# Patient Record
Sex: FEMALE | Race: WHITE | NOT HISPANIC OR LATINO | Employment: FULL TIME | RURAL
[De-identification: names, ages, dates, MRNs, and addresses within clinical notes are randomized per-mention and may not be internally consistent; named-entity substitution may affect disease eponyms.]

---

## 2017-08-09 ENCOUNTER — OFFICE VISIT (OUTPATIENT)
Dept: OBSTETRICS AND GYNECOLOGY | Facility: CLINIC | Age: 42
End: 2017-08-09

## 2017-08-09 VITALS
SYSTOLIC BLOOD PRESSURE: 145 MMHG | WEIGHT: 227 LBS | HEIGHT: 67 IN | BODY MASS INDEX: 35.63 KG/M2 | DIASTOLIC BLOOD PRESSURE: 95 MMHG

## 2017-08-09 DIAGNOSIS — E66.9 OBESITY, CLASS II, BMI 35-39.9: Chronic | ICD-10-CM

## 2017-08-09 DIAGNOSIS — F33.41 RECURRENT MAJOR DEPRESSIVE DISORDER, IN PARTIAL REMISSION (HCC): Chronic | ICD-10-CM

## 2017-08-09 DIAGNOSIS — K50.913 CROHN'S DISEASE WITH FISTULA, UNSPECIFIED GASTROINTESTINAL TRACT LOCATION (HCC): Chronic | ICD-10-CM

## 2017-08-09 DIAGNOSIS — I10 ESSENTIAL HYPERTENSION: Chronic | ICD-10-CM

## 2017-08-09 DIAGNOSIS — Z12.31 ENCOUNTER FOR SCREENING MAMMOGRAM FOR BREAST CANCER: ICD-10-CM

## 2017-08-09 DIAGNOSIS — Z01.419 ENCOUNTER FOR ANNUAL ROUTINE GYNECOLOGICAL EXAMINATION: Primary | ICD-10-CM

## 2017-08-09 PROBLEM — J45.909 ASTHMA: Chronic | Status: ACTIVE | Noted: 2017-08-09

## 2017-08-09 PROBLEM — K50.90 CROHN'S DISEASE (HCC): Chronic | Status: ACTIVE | Noted: 2017-08-09

## 2017-08-09 PROCEDURE — 99386 PREV VISIT NEW AGE 40-64: CPT | Performed by: OBSTETRICS & GYNECOLOGY

## 2017-08-09 PROCEDURE — 87624 HPV HI-RISK TYP POOLED RSLT: CPT | Performed by: OBSTETRICS & GYNECOLOGY

## 2017-08-09 PROCEDURE — 88142 CYTOPATH C/V THIN LAYER: CPT | Performed by: OBSTETRICS & GYNECOLOGY

## 2017-08-09 RX ORDER — AMLODIPINE BESYLATE 2.5 MG/1
TABLET ORAL
Refills: 5 | COMMUNITY
Start: 2017-07-24

## 2017-08-09 RX ORDER — CIPROFLOXACIN 500 MG/1
TABLET, FILM COATED ORAL
Refills: 0 | COMMUNITY
Start: 2017-07-20

## 2017-08-09 RX ORDER — NORETHINDRONE ACETATE AND ETHINYL ESTRADIOL AND FERROUS FUMARATE 1MG-20(21)
KIT ORAL
Refills: 6 | COMMUNITY
Start: 2017-07-28 | End: 2017-12-04 | Stop reason: SDUPTHER

## 2017-08-09 RX ORDER — BUPROPION HYDROCHLORIDE 150 MG/1
150 TABLET, EXTENDED RELEASE ORAL 2 TIMES DAILY
Qty: 60 TABLET | Refills: 11 | Status: SHIPPED | OUTPATIENT
Start: 2017-08-09

## 2017-08-09 NOTE — PROGRESS NOTES
Mike Restrepo is a 42 y.o. y/o female     Chief Complaint: Establish care, lethargy, weight gain, depression, needs birth control    HPI: 42-year-old  who had 1 prior vaginal delivery.  She has Crohn's disease with a fistula from her intestines to her bladder and extensive pelvic adhesive disease that made a laparoscopic sterilization not possible.  She also has hypertension.  She currently takes a generic birth control pill which she does not like the side effects.  She is has lethargy, weight gain, and depression.  I reviewed her extensive OB/GYN records.    Review of Systems   Constitutional: Positive for fatigue and unexpected weight change. Negative for activity change, appetite change, chills, diaphoresis and fever.   Gastrointestinal: Negative for abdominal pain, constipation, diarrhea and nausea.   Genitourinary: Positive for menstrual problem. Negative for difficulty urinating, dyspareunia, dysuria, pelvic pain, urgency, vaginal bleeding, vaginal discharge and vaginal pain.   Neurological: Negative for headaches.   Psychiatric/Behavioral: Positive for dysphoric mood. The patient is not nervous/anxious.    All other systems reviewed and are negative.     Breast ROS: negative    The following portions of the patient's history were reviewed and updated as appropriate: allergies, current medications, past family history, past medical history, past social history, past surgical history and problem list.    No Known Allergies       Current Outpatient Prescriptions:   •  amLODIPine (NORVASC) 2.5 MG tablet, TAKE 1 TABLET BY MOUTH EVERY DAY, Disp: , Rfl: 5  •  buPROPion SR (WELLBUTRIN SR) 150 MG 12 hr tablet, Take 1 tablet by mouth 2 (Two) Times a Day., Disp: 60 tablet, Rfl: 11  •  ciprofloxacin (CIPRO) 500 MG tablet, TAKE 1 TABLET BY MOUTH TWICE A DAY, Disp: , Rfl: 0  •  JUNEL FE 1/20 1-20 MG-MCG per tablet, TAKE 1 TABLET(S) EVERY DAY BY ORAL ROUTE., Disp: , Rfl: 6     The patient has a family history of  "  Family History   Problem Relation Age of Onset   • Coronary artery disease Father    • Melanoma Father    • Asthma Son         Past Medical History:   Diagnosis Date   • Asthma    • Crohn's disease    • Hypertension    • Obesity, Class II, BMI 35-39.9 8/9/2017   • Recurrent major depressive disorder, in partial remission 8/9/2017        OB History     No data available           Social History     Social History   • Marital status:      Spouse name: N/A   • Number of children: N/A   • Years of education: N/A     Occupational History   • Not on file.     Social History Main Topics   • Smoking status: Never Smoker   • Smokeless tobacco: Never Used   • Alcohol use No   • Drug use: No   • Sexual activity: Yes     Birth control/ protection: Pill     Other Topics Concern   • Not on file     Social History Narrative   • No narrative on file        Past Surgical History:   Procedure Laterality Date   • DIAGNOSTIC LAPAROSCOPY          Patient Active Problem List   Diagnosis   • Crohn's disease   • Asthma   • Hypertension   • Obesity, Class II, BMI 35-39.9   • Recurrent major depressive disorder, in partial remission        Documented Vitals    08/09/17 0908   BP: 145/95   Weight: 227 lb (103 kg)   Height: 67\" (170.2 cm)   PainSc: 0-No pain       Physical Exam   Constitutional: She is oriented to person, place, and time. No distress.   Obese white female weighing 227 pounds with BMI 35.6.   HENT:   Head: Normocephalic and atraumatic.   Eyes: Conjunctivae and EOM are normal. Pupils are equal, round, and reactive to light.   Neck: Normal range of motion. Neck supple. No JVD present. No tracheal deviation present. No thyromegaly present.   Cardiovascular: Normal rate, regular rhythm, normal heart sounds and intact distal pulses.  Exam reveals no gallop and no friction rub.    No murmur heard.  Pulmonary/Chest: Effort normal and breath sounds normal. No stridor. No respiratory distress. She has no wheezes. She has no " rales. She exhibits no tenderness. Right breast exhibits no inverted nipple, no mass, no nipple discharge, no skin change and no tenderness. Left breast exhibits no inverted nipple, no mass, no nipple discharge, no skin change and no tenderness. Breasts are symmetrical. There is no breast swelling.   Abdominal: Soft. Bowel sounds are normal. She exhibits no distension and no mass. There is no tenderness. There is no rebound and no guarding. No hernia. Hernia confirmed negative in the right inguinal area and confirmed negative in the left inguinal area.   Genitourinary: Vagina normal and uterus normal. Rectal exam shows no external hemorrhoid. No breast tenderness, discharge or bleeding. No labial fusion. There is no rash, tenderness, lesion or injury on the right labia. There is no rash, tenderness, lesion or injury on the left labia. Uterus is not deviated, not enlarged, not fixed and not tender. Cervix exhibits no motion tenderness, no discharge and no friability. Right adnexum displays no mass, no tenderness and no fullness. Left adnexum displays no mass, no tenderness and no fullness. No vaginal discharge found.   Genitourinary Comments: Pap smear of the cervix performed.  Urethral meatus without erythema or prolapse.   Musculoskeletal: Normal range of motion. She exhibits no edema, tenderness or deformity.   Lymphadenopathy:     She has no cervical adenopathy.        Right: No inguinal adenopathy present.        Left: No inguinal adenopathy present.   Neurological: She is alert and oriented to person, place, and time. She has normal reflexes. She displays normal reflexes. No cranial nerve deficit. She exhibits normal muscle tone. Coordination normal.   Skin: Skin is warm and dry. No rash noted. She is not diaphoretic. No erythema. No pallor.   Psychiatric: She has a normal mood and affect. Her behavior is normal. Judgment and thought content normal.   Nursing note and vitals reviewed.      Assessment         Diagnosis Plan   1. Encounter for annual routine gynecological examination  Liquid-based Pap Smear, Screening   2. Recurrent major depressive disorder, in partial remission     3. Essential hypertension     4. Crohn's disease with fistula, unspecified gastrointestinal tract location     5. Obesity, Class II, BMI 35-39.9     6. Encounter for screening mammogram for breast cancer  Mammo Screening Digital Tomosynthesis Bilateral With CAD         Plan      1. Wellbutrin  mg twice a day.  2. Lo Loestrin OCPs.  3. If problems with this birth control, consider Mirena IUD placement.  4. Encouraged in diet and exercise.  5. Handouts on depression, hot flashes, exercise, and vitamin use.   6. Follow-up in 6 weeks.  Follow-up sooner as needed.            This document has been electronically signed by Bonilla Link MD on August 9, 2017 10:25 PM

## 2017-08-16 LAB
LAB AP CASE REPORT: NORMAL
LAB AP GYN ADDITIONAL INFORMATION: NORMAL
Lab: NORMAL
PATH INTERP SPEC-IMP: NORMAL
STAT OF ADQ CVX/VAG CYTO-IMP: NORMAL

## 2017-08-21 LAB — HPV I/H RISK 4 DNA CVX QL PROBE+SIG AMP: NEGATIVE

## 2017-12-04 RX ORDER — NORETHINDRONE ACETATE AND ETHINYL ESTRADIOL AND FERROUS FUMARATE 1MG-20(21)
1 KIT ORAL DAILY
Qty: 28 TABLET | Refills: 12 | Status: SHIPPED | OUTPATIENT
Start: 2017-12-04 | End: 2018-02-06 | Stop reason: HOSPADM

## 2018-02-06 ENCOUNTER — TELEPHONE (OUTPATIENT)
Dept: OBSTETRICS AND GYNECOLOGY | Facility: CLINIC | Age: 43
End: 2018-02-06

## 2018-02-06 NOTE — TELEPHONE ENCOUNTER
----- Message from Lindsay Perla sent at 2/6/2018  1:22 PM CST -----  Contact: [phone]  Pt called and said that dr polk had giving her some samples of the low estrogen birth control. And she is wondering if he would write her a script for that because it seems to be working for her. She would like a call back.    Thanks

## 2018-12-10 RX ORDER — NORETHINDRONE ACETATE AND ETHINYL ESTRADIOL AND FERROUS FUMARATE 1MG-20(21)
1 KIT ORAL DAILY
Qty: 28 TABLET | Refills: 12 | Status: SHIPPED | OUTPATIENT
Start: 2018-12-10 | End: 2019-04-29 | Stop reason: SDUPTHER

## 2019-04-29 ENCOUNTER — PROCEDURE VISIT (OUTPATIENT)
Dept: OBSTETRICS AND GYNECOLOGY | Facility: CLINIC | Age: 44
End: 2019-04-29

## 2019-04-29 VITALS
HEART RATE: 109 BPM | WEIGHT: 232 LBS | BODY MASS INDEX: 37.28 KG/M2 | HEIGHT: 66 IN | SYSTOLIC BLOOD PRESSURE: 158 MMHG | DIASTOLIC BLOOD PRESSURE: 84 MMHG

## 2019-04-29 DIAGNOSIS — F40.243 FEAR OF FLYING: ICD-10-CM

## 2019-04-29 DIAGNOSIS — Z12.31 ENCOUNTER FOR SCREENING MAMMOGRAM FOR BREAST CANCER: ICD-10-CM

## 2019-04-29 DIAGNOSIS — Z30.41 SURVEILLANCE OF CONTRACEPTIVE PILL: ICD-10-CM

## 2019-04-29 DIAGNOSIS — Z01.419 ENCOUNTER FOR GYNECOLOGICAL EXAMINATION WITHOUT ABNORMAL FINDING: Primary | ICD-10-CM

## 2019-04-29 PROCEDURE — 99396 PREV VISIT EST AGE 40-64: CPT | Performed by: NURSE PRACTITIONER

## 2019-04-29 RX ORDER — ADALIMUMAB 40MG/0.4ML
KIT SUBCUTANEOUS
COMMUNITY
Start: 2019-04-09

## 2019-04-29 RX ORDER — LANOLIN ALCOHOL/MO/W.PET/CERES
50 CREAM (GRAM) TOPICAL DAILY
Refills: 0 | COMMUNITY
Start: 2019-03-15

## 2019-04-29 RX ORDER — SCOLOPAMINE TRANSDERMAL SYSTEM 1 MG/1
1 PATCH, EXTENDED RELEASE TRANSDERMAL
Qty: 10 EACH | Refills: 1 | Status: SHIPPED | OUTPATIENT
Start: 2019-04-29

## 2019-04-29 RX ORDER — ALPRAZOLAM 0.5 MG/1
0.5 TABLET ORAL 2 TIMES DAILY PRN
Qty: 14 TABLET | Refills: 0
Start: 2019-04-29

## 2019-04-29 RX ORDER — NORETHINDRONE ACETATE AND ETHINYL ESTRADIOL AND FERROUS FUMARATE 1MG-20(21)
1 KIT ORAL DAILY
Qty: 28 TABLET | Refills: 12 | Status: SHIPPED | OUTPATIENT
Start: 2019-04-29 | End: 2020-05-21

## 2019-04-29 RX ORDER — LANOLIN ALCOHOL/MO/W.PET/CERES
CREAM (GRAM) TOPICAL
Refills: 0 | COMMUNITY
Start: 2019-04-25

## 2019-04-29 NOTE — PROGRESS NOTES
Subjective   Mike Restrepo is a 44 y.o. Here for annual exam and requests refills on birth control. Also states that she is going on a vacation in  and has a fear of flying; has been prescribed a medication in the past and is requesting a prescription.    LMP- 3/27; no complaints  Last pap- 17 NIL, negative HR-HPV  Last mamm ~ has them done in [place] where she lives and works.      Gynecologic Exam   The patient's pertinent negatives include no genital itching, genital lesions, genital odor, genital rash, missed menses, pelvic pain, vaginal bleeding or vaginal discharge. Pertinent negatives include no abdominal pain, constipation, diarrhea or dysuria. She is sexually active. No, her partner does not have an STD. She uses oral contraceptives for contraception. Her menstrual history has been regular. There is no history of a  section, an ectopic pregnancy, endometriosis, a gynecological surgery, menorrhagia, miscarriage, ovarian cysts, PID, an STD, a terminated pregnancy or vaginosis.       The following portions of the patient's history were reviewed and updated as appropriate: allergies, current medications, past family history, past medical history, past social history, past surgical history and problem list.    Review of Systems   Constitutional: Negative for activity change, appetite change, diaphoresis, fatigue, unexpected weight gain and unexpected weight loss.   Respiratory: Negative for chest tightness and shortness of breath.    Cardiovascular: Negative for chest pain and palpitations.   Gastrointestinal: Negative for abdominal distention, abdominal pain, constipation and diarrhea.   Genitourinary: Negative for breast discharge, urinary incontinence, decreased libido, dyspareunia, dysuria, menorrhagia, menstrual problem, missed menses, pelvic pain, vaginal bleeding, vaginal discharge, vaginal pain, breast lump and breast pain.   Musculoskeletal: Negative for myalgias.   Skin: Negative  for color change, dry skin and skin lesions.   Neurological: Negative for light-headedness and headache.   Psychiatric/Behavioral: Negative for agitation, dysphoric mood, sleep disturbance, depressed mood and stress. The patient is not nervous/anxious.        Objective   Physical Exam   Constitutional: She is oriented to person, place, and time. She appears well-developed and well-nourished. No distress.   Neck: No thyroid mass and no thyromegaly present.   Cardiovascular: Normal rate, regular rhythm and normal heart sounds.   Pulmonary/Chest: Effort normal and breath sounds normal. Right breast exhibits no inverted nipple, no mass, no nipple discharge, no skin change and no tenderness. Left breast exhibits no inverted nipple, no mass, no nipple discharge, no skin change and no tenderness. Breasts are symmetrical. There is no breast swelling.   Genitourinary: No breast bleeding.   Genitourinary Comments: Pap smear not indicated. No GYN complaints. Pelvic exam deferred.    Neurological: She is alert and oriented to person, place, and time.   Skin: Skin is warm, dry and intact. She is not diaphoretic.   Psychiatric: She has a normal mood and affect. Her behavior is normal.   Nursing note and vitals reviewed.        Assessment/Plan   Mike was seen today for gynecologic exam.    Diagnoses and all orders for this visit:    Encounter for gynecological examination without abnormal finding    Encounter for screening mammogram for breast cancer  -     Mammo Screening Digital Tomosynthesis Bilateral With CAD; Future    Fear of flying    Surveillance of contraceptive pill    Other orders  -     JUNEL FE 1/20 1-20 MG-MCG per tablet; Take 1 tablet by mouth Daily.  -     Scopolamine (TRANSDERM-SCOP, 1.5 MG,) 1.5 MG/3DAYS patch; Place 1 patch on the skin as directed by provider Every 72 (Seventy-Two) Hours.  -     ALPRAZolam (XANAX) 0.5 MG tablet; Take 1 tablet by mouth 2 (Two) Times a Day As Needed for Anxiety.      PADDY  requested and reviewed. R/B/A and potential s/e of medications reviewed. F/U in 1 year for refills on OCPs and GYN exam or sooner, PRN.

## 2020-05-21 RX ORDER — NORETHINDRONE ACETATE AND ETHINYL ESTRADIOL AND FERROUS FUMARATE 1MG-20(21)
KIT ORAL
Qty: 28 TABLET | Refills: 12 | Status: SHIPPED | OUTPATIENT
Start: 2020-05-21

## 2020-12-10 ENCOUNTER — TRANSCRIBE ORDERS (OUTPATIENT)
Dept: ADMINISTRATIVE | Facility: HOSPITAL | Age: 45
End: 2020-12-10

## 2020-12-10 DIAGNOSIS — M79.642 LEFT HAND PAIN: ICD-10-CM

## 2020-12-10 DIAGNOSIS — M25.512 ACUTE PAIN OF LEFT SHOULDER: Primary | ICD-10-CM

## 2020-12-29 ENCOUNTER — HOSPITAL ENCOUNTER (OUTPATIENT)
Dept: NEUROLOGY | Facility: HOSPITAL | Age: 45
Discharge: HOME | End: 2020-12-29
Admitting: NURSE PRACTITIONER

## 2020-12-29 DIAGNOSIS — M25.512 ACUTE PAIN OF LEFT SHOULDER: ICD-10-CM

## 2020-12-29 DIAGNOSIS — M79.642 LEFT HAND PAIN: ICD-10-CM

## 2020-12-29 PROCEDURE — 95909 NRV CNDJ TST 5-6 STUDIES: CPT

## 2020-12-29 PROCEDURE — 95886 MUSC TEST DONE W/N TEST COMP: CPT
